# Patient Record
Sex: FEMALE
[De-identification: names, ages, dates, MRNs, and addresses within clinical notes are randomized per-mention and may not be internally consistent; named-entity substitution may affect disease eponyms.]

---

## 2023-12-13 ENCOUNTER — NON-APPOINTMENT (OUTPATIENT)
Age: 38
End: 2023-12-13

## 2023-12-13 ENCOUNTER — APPOINTMENT (OUTPATIENT)
Dept: OPHTHALMOLOGY | Facility: CLINIC | Age: 38
End: 2023-12-13
Payer: COMMERCIAL

## 2023-12-13 PROCEDURE — 92025 CPTRIZED CORNEAL TOPOGRAPHY: CPT

## 2023-12-13 PROCEDURE — 68761 CLOSE TEAR DUCT OPENING: CPT | Mod: E1,E2,E3,E4

## 2023-12-13 PROCEDURE — 92002 INTRM OPH EXAM NEW PATIENT: CPT | Mod: 25

## 2024-01-08 ENCOUNTER — APPOINTMENT (OUTPATIENT)
Dept: ORTHOPEDIC SURGERY | Facility: CLINIC | Age: 39
End: 2024-01-08

## 2024-01-17 ENCOUNTER — NON-APPOINTMENT (OUTPATIENT)
Age: 39
End: 2024-01-17

## 2024-01-17 ENCOUNTER — APPOINTMENT (OUTPATIENT)
Dept: OPHTHALMOLOGY | Facility: CLINIC | Age: 39
End: 2024-01-17
Payer: COMMERCIAL

## 2024-01-17 PROCEDURE — 92250 FUNDUS PHOTOGRAPHY W/I&R: CPT

## 2024-01-17 PROCEDURE — 92014 COMPRE OPH EXAM EST PT 1/>: CPT

## 2024-02-06 PROBLEM — Z00.00 ENCOUNTER FOR PREVENTIVE HEALTH EXAMINATION: Status: ACTIVE | Noted: 2024-02-06

## 2024-02-08 ENCOUNTER — APPOINTMENT (OUTPATIENT)
Dept: ORTHOPEDIC SURGERY | Facility: CLINIC | Age: 39
End: 2024-02-08
Payer: COMMERCIAL

## 2024-02-08 DIAGNOSIS — S63.501A UNSPECIFIED SPRAIN OF RIGHT WRIST, INITIAL ENCOUNTER: ICD-10-CM

## 2024-02-08 DIAGNOSIS — Z78.9 OTHER SPECIFIED HEALTH STATUS: ICD-10-CM

## 2024-02-08 DIAGNOSIS — M67.911 UNSPECIFIED DISORDER OF SYNOVIUM AND TENDON, RIGHT SHOULDER: ICD-10-CM

## 2024-02-08 DIAGNOSIS — M77.01 MEDIAL EPICONDYLITIS, RIGHT ELBOW: ICD-10-CM

## 2024-02-08 DIAGNOSIS — M25.531 PAIN IN RIGHT WRIST: ICD-10-CM

## 2024-02-08 PROCEDURE — 99203 OFFICE O/P NEW LOW 30 MIN: CPT

## 2024-02-08 PROCEDURE — 73030 X-RAY EXAM OF SHOULDER: CPT | Mod: RT

## 2024-02-08 PROCEDURE — 73100 X-RAY EXAM OF WRIST: CPT | Mod: RT

## 2024-02-09 PROBLEM — Z78.9 NON-SMOKER: Status: ACTIVE | Noted: 2024-02-09

## 2024-02-09 PROBLEM — Z78.9 MODERATE EXERCISE: Status: ACTIVE | Noted: 2024-02-09

## 2024-02-09 NOTE — REASON FOR VISIT
[Initial Visit] : an initial visit for [Shoulder Pain] : shoulder pain [FreeTextEntry2] : RIGHT wrist

## 2024-02-09 NOTE — PHYSICAL EXAM
[UE] : Sensory: Intact in bilateral upper extremities [Normal RUE] : Right Upper Extremity: No scars, rashes, lesions, ulcers, skin intact [Normal LUE] : Left Upper Extremity: No scars, rashes, lesions, ulcers, skin intact [Normal Touch] : sensation intact for touch [Normal] : Gait: normal [Rad] : radial 2+ and symmetric bilaterally [de-identified] : RIGHT shoulder No edema, ecchymoses, erythema, deformity. Tender over the long head of the biceps, anterior shoulder. Active range of motion of the shoulders with 180 degrees forward elevation and internal rotation to T9. Pain with Neer and mildly with Wade. 5/5 supraspinatus, internal rotation, external rotation, biceps.  Right elbow Range of motion 0 to 145 degrees flexion and 90 degrees pronation and supination. Tender mildly over the medial epicondyle and common flexor pronator origin. No edema, ecchymoses, erythema, deformity.  Right wrist No edema, ecchymoses, erythema. Tender over the ulnar side of the wrist. Pain with maximum supination and ulnar deviation.  Pain worse with flexion and mildly on the wrist as well. Intact range of motion with about 80 degrees dorsiflexion and 90 degrees of palmar flexion. Intact range of motion of the fingers. Motor and sensation are intact.  [de-identified] :  X-rays ordered, performed and reviewed today of RIGHT wrist 3 views for wrist pain showed ulnar neutral to slightly ulnar positive variance.  No fractures.  Otherwise unremarkable.   X-rays ordered, performed and reviewed today of RIGHT shoulder for chronic shoulder pain AP, Y lateral and axillary views were unremarkable.  No calcifications or bone lesions.

## 2024-02-09 NOTE — ASSESSMENT
[FreeTextEntry1] : 37 y/o female right-hand-dominant with persistent right ulnar-sided wrist pain going on for more than 3 months and has not improved with 2 months of rest and NSAIDs and wearing a wrist splint. She could have a tear of the TFCC given her exam findings.  I referred her for an MRI to see if there is a tear.  We may try steroid injection or I may refer her to the hand surgeon for further evaluation.  In the meantime she can continue with the treatment and use heat and ice.  I would not recommend getting back to tennis yet because of the wrist but also she is having elbow and shoulder pain.  I recommended doing some hand and physical therapy for the knees.  The elbow has mild medial epicondylitis and her shoulder she likely has some biceps and rotator cuff tendinopathy.  I gave her some home exercises for the shoulder and she was given other exercises by the other doctor.  She can take Voltaren or other NSAID as well for another week or 2.  I will call her with the MRI results of the wrist and discuss findings.  I would otherwise see her back in about 4 to 6 weeks.

## 2024-02-09 NOTE — HISTORY OF PRESENT ILLNESS
[de-identified] :  Ms. Craig is a 38 year old RHD woman who comes in for evaluation for RIGHT wrist pain that started about 2.5 months ago she feels from over use, playing tennis..  She started playing tennis in September 2023 and increased her hours playing. She ignored the pain for a while but it got progressively worse and more constant. She rested for 2 months.  She is only played tennis of couple times in the last few months but has ongoing pain that really does not seem to be improving.  She also tried a wrist splint. Her pain ranges from 3-8/10 that is better with rest. She has pain lifting weights, pushups, opening jars.  She saw a rehab doctor for right shoulder pain.  She was given exercises to do and diclofenac which she took for about 10 days to 2 weeks.  She has ongoing shoulder pain and is also now having right medial elbow pain.  The doctor had done an ultrasound of the shoulder and apparently there would have been some fraying but no tears in the rotator cuff was seen She has not done any physical therapy.

## 2024-02-22 ENCOUNTER — TRANSCRIPTION ENCOUNTER (OUTPATIENT)
Age: 39
End: 2024-02-22

## 2024-02-23 ENCOUNTER — TRANSCRIPTION ENCOUNTER (OUTPATIENT)
Age: 39
End: 2024-02-23

## 2024-02-26 ENCOUNTER — NON-APPOINTMENT (OUTPATIENT)
Age: 39
End: 2024-02-26

## 2024-02-26 ENCOUNTER — APPOINTMENT (OUTPATIENT)
Dept: OPHTHALMOLOGY | Facility: CLINIC | Age: 39
End: 2024-02-26
Payer: COMMERCIAL

## 2024-02-26 PROCEDURE — 92133 CPTRZD OPH DX IMG PST SGM ON: CPT

## 2024-02-26 PROCEDURE — 92012 INTRM OPH EXAM EST PATIENT: CPT | Mod: 25

## 2024-02-26 PROCEDURE — 68761 CLOSE TEAR DUCT OPENING: CPT

## 2024-02-26 PROCEDURE — 67820 REVISE EYELASHES: CPT | Mod: RT

## 2024-07-31 ENCOUNTER — NON-APPOINTMENT (OUTPATIENT)
Age: 39
End: 2024-07-31

## 2024-07-31 ENCOUNTER — APPOINTMENT (OUTPATIENT)
Dept: OPHTHALMOLOGY | Facility: CLINIC | Age: 39
End: 2024-07-31
Payer: COMMERCIAL

## 2024-07-31 PROCEDURE — 92012 INTRM OPH EXAM EST PATIENT: CPT | Mod: 25

## 2024-07-31 PROCEDURE — 68761 CLOSE TEAR DUCT OPENING: CPT | Mod: E1,E2,E3,E4

## 2024-09-06 ENCOUNTER — EMERGENCY (EMERGENCY)
Facility: HOSPITAL | Age: 39
LOS: 1 days | Discharge: HOME | End: 2024-09-06
Attending: STUDENT IN AN ORGANIZED HEALTH CARE EDUCATION/TRAINING PROGRAM | Admitting: STUDENT IN AN ORGANIZED HEALTH CARE EDUCATION/TRAINING PROGRAM
Payer: COMMERCIAL

## 2024-09-06 VITALS — WEIGHT: 117.95 LBS | TEMPERATURE: 99 F | OXYGEN SATURATION: 100 % | RESPIRATION RATE: 18 BRPM | HEART RATE: 56 BPM

## 2024-09-06 DIAGNOSIS — R19.7 DIARRHEA, UNSPECIFIED: ICD-10-CM

## 2024-09-06 DIAGNOSIS — R11.2 NAUSEA WITH VOMITING, UNSPECIFIED: ICD-10-CM

## 2024-09-06 DIAGNOSIS — N83.202 UNSPECIFIED OVARIAN CYST, LEFT SIDE: ICD-10-CM

## 2024-09-06 DIAGNOSIS — R10.9 UNSPECIFIED ABDOMINAL PAIN: ICD-10-CM

## 2024-09-06 LAB
ALBUMIN SERPL ELPH-MCNC: 4.4 G/DL — SIGNIFICANT CHANGE UP (ref 3.3–5)
ALP SERPL-CCNC: 43 U/L — SIGNIFICANT CHANGE UP (ref 40–120)
ALT FLD-CCNC: 10 U/L — SIGNIFICANT CHANGE UP (ref 10–45)
ANION GAP SERPL CALC-SCNC: 11 MMOL/L — SIGNIFICANT CHANGE UP (ref 5–17)
AST SERPL-CCNC: 15 U/L — SIGNIFICANT CHANGE UP (ref 10–40)
BILIRUB DIRECT SERPL-MCNC: 0.2 MG/DL — SIGNIFICANT CHANGE UP (ref 0–0.3)
BILIRUB INDIRECT FLD-MCNC: 1.2 MG/DL — HIGH (ref 0.2–1)
BILIRUB SERPL-MCNC: 1.4 MG/DL — HIGH (ref 0.2–1.2)
BUN SERPL-MCNC: 6 MG/DL — LOW (ref 7–23)
CALCIUM SERPL-MCNC: 9.1 MG/DL — SIGNIFICANT CHANGE UP (ref 8.4–10.5)
CHLORIDE SERPL-SCNC: 100 MMOL/L — SIGNIFICANT CHANGE UP (ref 96–108)
CO2 SERPL-SCNC: 22 MMOL/L — SIGNIFICANT CHANGE UP (ref 22–31)
CREAT SERPL-MCNC: 0.58 MG/DL — SIGNIFICANT CHANGE UP (ref 0.5–1.3)
EGFR: 119 ML/MIN/1.73M2 — SIGNIFICANT CHANGE UP
GLUCOSE SERPL-MCNC: 123 MG/DL — HIGH (ref 70–99)
HCG SERPL-ACNC: <1 MIU/ML — SIGNIFICANT CHANGE UP
HCT VFR BLD CALC: 38 % — SIGNIFICANT CHANGE UP (ref 34.5–45)
HGB BLD-MCNC: 13 G/DL — SIGNIFICANT CHANGE UP (ref 11.5–15.5)
LIDOCAIN IGE QN: 23 U/L — SIGNIFICANT CHANGE UP (ref 7–60)
MCHC RBC-ENTMCNC: 30.7 PG — SIGNIFICANT CHANGE UP (ref 27–34)
MCHC RBC-ENTMCNC: 34.2 GM/DL — SIGNIFICANT CHANGE UP (ref 32–36)
MCV RBC AUTO: 89.6 FL — SIGNIFICANT CHANGE UP (ref 80–100)
NRBC # BLD: 0 /100 WBCS — SIGNIFICANT CHANGE UP (ref 0–0)
PLATELET # BLD AUTO: 213 K/UL — SIGNIFICANT CHANGE UP (ref 150–400)
POTASSIUM SERPL-MCNC: 3.8 MMOL/L — SIGNIFICANT CHANGE UP (ref 3.5–5.3)
POTASSIUM SERPL-SCNC: 3.8 MMOL/L — SIGNIFICANT CHANGE UP (ref 3.5–5.3)
PROT SERPL-MCNC: 7.1 G/DL — SIGNIFICANT CHANGE UP (ref 6–8.3)
RBC # BLD: 4.24 M/UL — SIGNIFICANT CHANGE UP (ref 3.8–5.2)
RBC # FLD: 11.8 % — SIGNIFICANT CHANGE UP (ref 10.3–14.5)
SODIUM SERPL-SCNC: 133 MMOL/L — LOW (ref 135–145)
WBC # BLD: 6.9 K/UL — SIGNIFICANT CHANGE UP (ref 3.8–10.5)
WBC # FLD AUTO: 6.9 K/UL — SIGNIFICANT CHANGE UP (ref 3.8–10.5)

## 2024-09-06 PROCEDURE — 76856 US EXAM PELVIC COMPLETE: CPT | Mod: 26

## 2024-09-06 PROCEDURE — 99285 EMERGENCY DEPT VISIT HI MDM: CPT

## 2024-09-06 PROCEDURE — 76830 TRANSVAGINAL US NON-OB: CPT | Mod: 26

## 2024-09-06 RX ORDER — SODIUM CHLORIDE 9 MG/ML
1000 INJECTION INTRAMUSCULAR; INTRAVENOUS; SUBCUTANEOUS ONCE
Refills: 0 | Status: COMPLETED | OUTPATIENT
Start: 2024-09-06 | End: 2024-09-06

## 2024-09-06 RX ORDER — ONDANSETRON 2 MG/ML
4 INJECTION, SOLUTION INTRAMUSCULAR; INTRAVENOUS ONCE
Refills: 0 | Status: COMPLETED | OUTPATIENT
Start: 2024-09-06 | End: 2024-09-06

## 2024-09-06 RX ADMIN — SODIUM CHLORIDE 1000 MILLILITER(S): 9 INJECTION INTRAMUSCULAR; INTRAVENOUS; SUBCUTANEOUS at 21:37

## 2024-09-06 RX ADMIN — Medication 4 MILLIGRAM(S): at 21:37

## 2024-09-06 RX ADMIN — ONDANSETRON 4 MILLIGRAM(S): 2 INJECTION, SOLUTION INTRAMUSCULAR; INTRAVENOUS at 21:37

## 2024-09-06 NOTE — ED ADULT NURSE NOTE - OBJECTIVE STATEMENT
Pt  is a 39 year old female came in complaining of severe abdominal pain in hypogastric area. Pt also complaining of nausea and vomited more than 5 episodes.

## 2024-09-06 NOTE — CONSULT NOTE ADULT - SUBJECTIVE AND OBJECTIVE BOX
38 yo P2 presents with left lower pelvic pain that began at 19:00 on . Patient states she was cooking dinner and then she started having cramping which turned into severe pain 10/10 in severity. Her pain was a/w with nausea and vomiting and she had a few episodes of diarrhea. Patient presented to [facility] at 04:00 where she had a TVUS and CT scan, received morphine and was discharged with gastroenteritis. She states she felt better but then presented here because her severe pain came back. Patient does not get her menses because she has an IUD in place. Patient states that this pain was as bad as childbirth.         OB/GYN Hx:  2017, 2018  PMHx: denies  SHx: bunion sx  Meds: wellbutrin 5mg  Allergies: NKDA    PHYSICAL EXAM:   Vital Signs Last 24 Hrs  T(C): 37.1 (06 Sep 2024 21:07), Max: 37.1 (06 Sep 2024 21:07)  T(F): 98.7 (06 Sep 2024 21:07), Max: 98.7 (06 Sep 2024 21:07)  HR: 56 (06 Sep 2024 21:07) (56 - 56)  BP: --  BP(mean): --  RR: 18 (06 Sep 2024 21:07) (18 - 18)  SpO2: 100% (06 Sep 2024 21:07) (100% - 100%)    Parameters below as of 06 Sep 2024 21:07  Patient On (Oxygen Delivery Method): room air        **************************  Constitutional: Alert & Oriented x3, No acute distress  Respiratory: Clear to ausculation bilaterally; no wheezing, rhonchi, or crackles  Cardiovascular: regular rate and rhythm, no murmurs, or gallops  Gastrointestinal: soft, non tender, positive bowel sounds, no rebound or guarding   Pelvic exam:   Extremities: no calf tenderness or swelling      LABS:                        13.0   6.90  )-----------( 213      ( 06 Sep 2024 21:19 )             38.0     09-06    133<L>  |  100  |  6<L>  ----------------------------<  123<H>  3.8   |  22  |  0.58    Ca    9.1      06 Sep 2024 21:19    TPro  7.1  /  Alb  4.4  /  TBili  1.4<H>  /  DBili  0.2  /  AST  15  /  ALT  10  /  AlkPhos  43        Urinalysis Basic - ( 06 Sep 2024 21:19 )    Color: x / Appearance: x / SG: x / pH: x  Gluc: 123 mg/dL / Ketone: x  / Bili: x / Urobili: x   Blood: x / Protein: x / Nitrite: x   Leuk Esterase: x / RBC: x / WBC x   Sq Epi: x / Non Sq Epi: x / Bacteria: x      HCG Quantitative, Serum: <1 mIU/mL ( @ 21:19)      RADIOLOGY & ADDITIONAL STUDIES: 40 yo P2 presents with left lower pelvic pain that began at 19:00 on . Patient states she was cooking dinner and then she started having cramping which turned into severe pain 10/10 in severity. Her pain was a/w with nausea and vomiting and she had a few episodes of diarrhea. Patient presented to [facility] at 04:00 where she had a TVUS and CT scan, received morphine and was discharged with gastroenteritis. She states she felt better but then presented here because her severe pain came back. Patient does not get her menses because she has an IUD in place. At the time of initial interview patient rated her pain as 3/10 in severity, cramping in nature.      OB/GYN Hx:  , 2018  PMHx: denies  SHx: bunion sx  Meds: wellbutrin 5mg  Allergies: NKDA    PHYSICAL EXAM:   Vital Signs Last 24 Hrs  T(C): 37.1 (06 Sep 2024 21:07), Max: 37.1 (06 Sep 2024 21:07)  T(F): 98.7 (06 Sep 2024 21:07), Max: 98.7 (06 Sep 2024 21:07)  HR: 56 (06 Sep 2024 21:07) (56 - 56)  BP: --  BP(mean): --  RR: 18 (06 Sep 2024 21:07) (18 - 18)  SpO2: 100% (06 Sep 2024 21:07) (100% - 100%)    Parameters below as of 06 Sep 2024 21:07  Patient On (Oxygen Delivery Method): room air        **************************  Constitutional: Alert & Oriented x3, No acute distress  Respiratory: Clear to ausculation bilaterally; no wheezing, rhonchi, or crackles  Cardiovascular: regular rate and rhythm, no murmurs, or gallops  Gastrointestinal: soft, mild-to moderate left lower quadrant tenderness, no rebound or guarding   Pelvic exam: no CMT, no adnexal tenderness bilaterally  Extremities: no calf tenderness or swelling      LABS:                        13.0   6.90  )-----------( 213      ( 06 Sep 2024 21:19 )             38.0     09-06    133<L>  |  100  |  6<L>  ----------------------------<  123<H>  3.8   |  22  |  0.58    Ca    9.1      06 Sep 2024 21:19    TPro  7.1  /  Alb  4.4  /  TBili  1.4<H>  /  DBili  0.2  /  AST  15  /  ALT  10  /  AlkPhos  43        Urinalysis Basic - ( 06 Sep 2024 21:19 )    Color: x / Appearance: x / SG: x / pH: x  Gluc: 123 mg/dL / Ketone: x  / Bili: x / Urobili: x   Blood: x / Protein: x / Nitrite: x   Leuk Esterase: x / RBC: x / WBC x   Sq Epi: x / Non Sq Epi: x / Bacteria: x      HCG Quantitative, Serum: <1 mIU/mL ( @ 21:19)      RADIOLOGY & ADDITIONAL STUDIES:  TECHNIQUE:  Endovaginal and transabdominal pelvic sonogram. Color and Spectral   Doppler was performed.    FINDINGS:  Uterus: 6.6 cm x 3.8 cm x 4.9 cm. Satisfactory position of intrauterine   device with stem aligned within the endometrial cavity and cross bars   extending laterally at fundus.  Endometrium: 2 mm. Within normal limits. Cervical nabothian cysts.    Right ovary: 2.3 cm x 2.2 cm x 1.7 cm calculated volume of 4.3 cc. Within   normal limits. Normal arterial and venous waveforms.  Left ovary: 4.6 cm x 2.5 cm x 2.6 cm calculated volume of 15.5 cc.   ovarian cyst measuring 3.4 x 2.5 x 2.3 cm. Normal arterial and venous   waveforms.    Fluid: Trace.    IMPRESSION:  1.  No sonographic evidence of active torsion.  2.  3.4 cm left ovarian cyst.

## 2024-09-06 NOTE — ED PROVIDER NOTE - OBJECTIVE STATEMENT
39 F no sig PMH seen at [facility] this morning for abd pain, nvd. Had 3.7 cm ovarian cyst without torsion, otherwise neg US and CTAP. Had several episodes of watery stools last night, vomiting today. unable to keep anything down. Pain primarily in left lower quadrant. [place] LMP given IUD. No abnl vag discharge.

## 2024-09-06 NOTE — ED PROVIDER NOTE - CLINICAL SUMMARY MEDICAL DECISION MAKING FREE TEXT BOX
39 F no sig PMH seen at [facility] this morning for abd pain, nvd. Had 3.7 cm left ovarian cyst without torsion, otherwise neg US and CTAP. Had several episodes of watery stools last night, vomiting today. unable to keep anything down. Pain primarily in left lower quadrant. [place] LMP given IUD. No abnl vag discharge.    c/f intermittent torsion vs torsion at this time iso neg CTAP this morning other than 3.7 cm ovarian cyst and location of cyst. Plan for labs, rpt sono, gyn consult for intermittent torsion.

## 2024-09-06 NOTE — ED PROVIDER NOTE - PROGRESS NOTE DETAILS
Jesu: seen by gyn, no concern for torsion. more likely gastroenteritis. pt tolerating PO. labs wnl. return precautions discussed

## 2024-09-06 NOTE — ED ADULT NURSE NOTE - NSFALLUNIVINTERV_ED_ALL_ED
Bed/Stretcher in lowest position, wheels locked, appropriate side rails in place/Call bell, personal items and telephone in reach/Instruct patient to call for assistance before getting out of bed/chair/stretcher/Non-slip footwear applied when patient is off stretcher/[place] to call system/Physically safe environment - no spills, clutter or unnecessary equipment/Purposeful proactive rounding/Room/bathroom lighting operational, light cord in reach

## 2024-09-06 NOTE — CONSULT NOTE ADULT - ASSESSMENT
38 yo P2 presents with intermittent left lower quadrant pain. On first interview patients pain was 3/10 in severity, upon second time evaluating patient with chief resident Dr. Naylor and attending Dr. Dorantes patient states her pain was starting to creep up and is now 6/10 cramping and associated with nausea. Patient has a benign abdomen and TVUS significant for 3cm left ovarian cyst.  -patient is currently hemodynamically and clinically stable  -abdominal exam performed at bedside with  who agreed it was benign and less likely to be torsion  -differential includes ruptured ovarian cyst, gastroenteritis given patient has diarrhea or that patient had torsion and detorsion and now has residual cramping  -currently low suspicion for ovarian torsion  -patient to take antiemetics for nausea and analgesics for pain  -patient to follow up with obgyn next week  -return precautions including severe pain and/or bleeding given to patient  -all questions answered and patient expressed understanding  -discussed with chief resident Dr. Naylor and  Dr. Dorantes who were both present for evaluation    NS PGY2

## 2024-09-06 NOTE — ED PROVIDER NOTE - PATIENT PORTAL LINK FT
You can access the FollowMyHealth Patient Portal offered by [facility] by registering at the following website: [email or web address]. By joining [employer]’s FollowMyHealth portal, you will also be able to view your health information using other applications (apps) compatible with our system.

## 2024-09-07 ENCOUNTER — INPATIENT (INPATIENT)
Facility: HOSPITAL | Age: 39
LOS: 0 days | Discharge: HOME | DRG: 392 | End: 2024-09-08
Attending: HOSPITALIST | Admitting: HOSPITALIST
Payer: COMMERCIAL

## 2024-09-07 VITALS
WEIGHT: 117.95 LBS | TEMPERATURE: 98 F | OXYGEN SATURATION: 100 % | RESPIRATION RATE: 18 BRPM | HEART RATE: 67 BPM | DIASTOLIC BLOOD PRESSURE: 78 MMHG | SYSTOLIC BLOOD PRESSURE: 114 MMHG

## 2024-09-07 VITALS
OXYGEN SATURATION: 97 % | SYSTOLIC BLOOD PRESSURE: 105 MMHG | RESPIRATION RATE: 18 BRPM | DIASTOLIC BLOOD PRESSURE: 64 MMHG | TEMPERATURE: 99 F | HEART RATE: 60 BPM

## 2024-09-07 DIAGNOSIS — R10.9 UNSPECIFIED ABDOMINAL PAIN: ICD-10-CM

## 2024-09-07 DIAGNOSIS — R11.2 NAUSEA WITH VOMITING, UNSPECIFIED: ICD-10-CM

## 2024-09-07 DIAGNOSIS — F41.9 ANXIETY DISORDER, UNSPECIFIED: ICD-10-CM

## 2024-09-07 DIAGNOSIS — Z29.9 ENCOUNTER FOR PROPHYLACTIC MEASURES, UNSPECIFIED: ICD-10-CM

## 2024-09-07 DIAGNOSIS — N83.209 UNSPECIFIED OVARIAN CYST, UNSPECIFIED SIDE: ICD-10-CM

## 2024-09-07 LAB
ALBUMIN SERPL ELPH-MCNC: 4.5 G/DL — SIGNIFICANT CHANGE UP (ref 3.3–5)
ALP SERPL-CCNC: 45 U/L — SIGNIFICANT CHANGE UP (ref 40–120)
ALT FLD-CCNC: 11 U/L — SIGNIFICANT CHANGE UP (ref 10–45)
ANION GAP SERPL CALC-SCNC: 10 MMOL/L — SIGNIFICANT CHANGE UP (ref 5–17)
APPEARANCE UR: CLEAR — SIGNIFICANT CHANGE UP
AST SERPL-CCNC: 15 U/L — SIGNIFICANT CHANGE UP (ref 10–40)
BASOPHILS # BLD AUTO: 0 K/UL — SIGNIFICANT CHANGE UP (ref 0–0.2)
BASOPHILS NFR BLD AUTO: 0 % — SIGNIFICANT CHANGE UP (ref 0–2)
BILIRUB SERPL-MCNC: 1.2 MG/DL — SIGNIFICANT CHANGE UP (ref 0.2–1.2)
BILIRUB UR-MCNC: NEGATIVE — SIGNIFICANT CHANGE UP
BLD GP AB SCN SERPL QL: NEGATIVE — SIGNIFICANT CHANGE UP
BUN SERPL-MCNC: 5 MG/DL — LOW (ref 7–23)
CALCIUM SERPL-MCNC: 9.5 MG/DL — SIGNIFICANT CHANGE UP (ref 8.4–10.5)
CHLORIDE SERPL-SCNC: 104 MMOL/L — SIGNIFICANT CHANGE UP (ref 96–108)
CO2 SERPL-SCNC: 21 MMOL/L — LOW (ref 22–31)
COLOR SPEC: YELLOW — SIGNIFICANT CHANGE UP
CREAT SERPL-MCNC: 0.63 MG/DL — SIGNIFICANT CHANGE UP (ref 0.5–1.3)
DIFF PNL FLD: NEGATIVE — SIGNIFICANT CHANGE UP
EGFR: 117 ML/MIN/1.73M2 — SIGNIFICANT CHANGE UP
EOSINOPHIL # BLD AUTO: 0 K/UL — SIGNIFICANT CHANGE UP (ref 0–0.5)
EOSINOPHIL NFR BLD AUTO: 0 % — SIGNIFICANT CHANGE UP (ref 0–6)
GI PCR PANEL: SIGNIFICANT CHANGE UP
GLUCOSE SERPL-MCNC: 121 MG/DL — HIGH (ref 70–99)
GLUCOSE UR QL: NEGATIVE MG/DL — SIGNIFICANT CHANGE UP
HCG UR QL: NEGATIVE — SIGNIFICANT CHANGE UP
HCT VFR BLD CALC: 39.7 % — SIGNIFICANT CHANGE UP (ref 34.5–45)
HGB BLD-MCNC: 13.4 G/DL — SIGNIFICANT CHANGE UP (ref 11.5–15.5)
IMM GRANULOCYTES NFR BLD AUTO: 0.3 % — SIGNIFICANT CHANGE UP (ref 0–0.9)
KETONES UR-MCNC: ABNORMAL MG/DL
LEUKOCYTE ESTERASE UR-ACNC: NEGATIVE — SIGNIFICANT CHANGE UP
LIDOCAIN IGE QN: 22 U/L — SIGNIFICANT CHANGE UP (ref 7–60)
LYMPHOCYTES # BLD AUTO: 0.8 K/UL — LOW (ref 1–3.3)
LYMPHOCYTES # BLD AUTO: 11.1 % — LOW (ref 13–44)
MCHC RBC-ENTMCNC: 30.9 PG — SIGNIFICANT CHANGE UP (ref 27–34)
MCHC RBC-ENTMCNC: 33.8 GM/DL — SIGNIFICANT CHANGE UP (ref 32–36)
MCV RBC AUTO: 91.5 FL — SIGNIFICANT CHANGE UP (ref 80–100)
MONOCYTES # BLD AUTO: 0.19 K/UL — SIGNIFICANT CHANGE UP (ref 0–0.9)
MONOCYTES NFR BLD AUTO: 2.6 % — SIGNIFICANT CHANGE UP (ref 2–14)
NEUTROPHILS # BLD AUTO: 6.2 K/UL — SIGNIFICANT CHANGE UP (ref 1.8–7.4)
NEUTROPHILS NFR BLD AUTO: 86 % — HIGH (ref 43–77)
NITRITE UR-MCNC: NEGATIVE — SIGNIFICANT CHANGE UP
NRBC # BLD: 0 /100 WBCS — SIGNIFICANT CHANGE UP (ref 0–0)
PH UR: 8 — SIGNIFICANT CHANGE UP (ref 5–8)
PLATELET # BLD AUTO: 228 K/UL — SIGNIFICANT CHANGE UP (ref 150–400)
POTASSIUM SERPL-MCNC: 3.8 MMOL/L — SIGNIFICANT CHANGE UP (ref 3.5–5.3)
POTASSIUM SERPL-SCNC: 3.8 MMOL/L — SIGNIFICANT CHANGE UP (ref 3.5–5.3)
PROT SERPL-MCNC: 7.3 G/DL — SIGNIFICANT CHANGE UP (ref 6–8.3)
PROT UR-MCNC: NEGATIVE MG/DL — SIGNIFICANT CHANGE UP
RBC # BLD: 4.34 M/UL — SIGNIFICANT CHANGE UP (ref 3.8–5.2)
RBC # FLD: 11.8 % — SIGNIFICANT CHANGE UP (ref 10.3–14.5)
RH IG SCN BLD-IMP: POSITIVE — SIGNIFICANT CHANGE UP
SODIUM SERPL-SCNC: 135 MMOL/L — SIGNIFICANT CHANGE UP (ref 135–145)
SP GR SPEC: 1 — SIGNIFICANT CHANGE UP (ref 1–1.03)
UROBILINOGEN FLD QL: 0.2 MG/DL — SIGNIFICANT CHANGE UP (ref 0.2–1)
WBC # BLD: 7.21 K/UL — SIGNIFICANT CHANGE UP (ref 3.8–10.5)
WBC # FLD AUTO: 7.21 K/UL — SIGNIFICANT CHANGE UP (ref 3.8–10.5)

## 2024-09-07 PROCEDURE — 96375 TX/PRO/DX INJ NEW DRUG ADDON: CPT

## 2024-09-07 PROCEDURE — 36415 COLL VENOUS BLD VENIPUNCTURE: CPT

## 2024-09-07 PROCEDURE — 86850 RBC ANTIBODY SCREEN: CPT

## 2024-09-07 PROCEDURE — 96376 TX/PRO/DX INJ SAME DRUG ADON: CPT

## 2024-09-07 PROCEDURE — 99223 1ST HOSP IP/OBS HIGH 75: CPT | Mod: GC

## 2024-09-07 PROCEDURE — 85610 PROTHROMBIN TIME: CPT

## 2024-09-07 PROCEDURE — 76856 US EXAM PELVIC COMPLETE: CPT

## 2024-09-07 PROCEDURE — 80076 HEPATIC FUNCTION PANEL: CPT

## 2024-09-07 PROCEDURE — 85027 COMPLETE CBC AUTOMATED: CPT

## 2024-09-07 PROCEDURE — 80048 BASIC METABOLIC PNL TOTAL CA: CPT

## 2024-09-07 PROCEDURE — 99284 EMERGENCY DEPT VISIT MOD MDM: CPT | Mod: 25

## 2024-09-07 PROCEDURE — 85730 THROMBOPLASTIN TIME PARTIAL: CPT

## 2024-09-07 PROCEDURE — 84702 CHORIONIC GONADOTROPIN TEST: CPT

## 2024-09-07 PROCEDURE — 99285 EMERGENCY DEPT VISIT HI MDM: CPT

## 2024-09-07 PROCEDURE — 74177 CT ABD & PELVIS W/CONTRAST: CPT | Mod: 26,MC

## 2024-09-07 PROCEDURE — 86901 BLOOD TYPING SEROLOGIC RH(D): CPT

## 2024-09-07 PROCEDURE — 86900 BLOOD TYPING SEROLOGIC ABO: CPT

## 2024-09-07 PROCEDURE — 76830 TRANSVAGINAL US NON-OB: CPT

## 2024-09-07 PROCEDURE — 83690 ASSAY OF LIPASE: CPT

## 2024-09-07 PROCEDURE — 96374 THER/PROPH/DIAG INJ IV PUSH: CPT

## 2024-09-07 RX ORDER — IOHEXOL 350 MG/ML
30 INJECTION, SOLUTION INTRAVENOUS ONCE
Refills: 0 | Status: COMPLETED | OUTPATIENT
Start: 2024-09-07 | End: 2024-09-07

## 2024-09-07 RX ORDER — ONDANSETRON 2 MG/ML
4 INJECTION, SOLUTION INTRAMUSCULAR; INTRAVENOUS ONCE
Refills: 0 | Status: COMPLETED | OUTPATIENT
Start: 2024-09-07 | End: 2024-09-07

## 2024-09-07 RX ORDER — KETOROLAC TROMETHAMINE 30 MG/ML
15 INJECTION, SOLUTION INTRAMUSCULAR EVERY 6 HOURS
Refills: 0 | Status: DISCONTINUED | OUTPATIENT
Start: 2024-09-07 | End: 2024-09-08

## 2024-09-07 RX ORDER — SODIUM CHLORIDE 9 MG/ML
1000 INJECTION INTRAMUSCULAR; INTRAVENOUS; SUBCUTANEOUS ONCE
Refills: 0 | Status: COMPLETED | OUTPATIENT
Start: 2024-09-07 | End: 2024-09-07

## 2024-09-07 RX ORDER — ONDANSETRON 2 MG/ML
4 INJECTION, SOLUTION INTRAMUSCULAR; INTRAVENOUS EVERY 6 HOURS
Refills: 0 | Status: DISCONTINUED | OUTPATIENT
Start: 2024-09-07 | End: 2024-09-08

## 2024-09-07 RX ORDER — ESCITALOPRAM OXALATE 10 MG/1
1 TABLET ORAL
Refills: 0 | DISCHARGE

## 2024-09-07 RX ORDER — ACETAMINOPHEN 325 MG/1
650 TABLET ORAL EVERY 6 HOURS
Refills: 0 | Status: DISCONTINUED | OUTPATIENT
Start: 2024-09-07 | End: 2024-09-08

## 2024-09-07 RX ADMIN — Medication 4 MILLIGRAM(S): at 13:13

## 2024-09-07 RX ADMIN — Medication 4 MILLIGRAM(S): at 02:27

## 2024-09-07 RX ADMIN — KETOROLAC TROMETHAMINE 15 MILLIGRAM(S): 30 INJECTION, SOLUTION INTRAMUSCULAR at 18:20

## 2024-09-07 RX ADMIN — ONDANSETRON 4 MILLIGRAM(S): 2 INJECTION, SOLUTION INTRAMUSCULAR; INTRAVENOUS at 11:17

## 2024-09-07 RX ADMIN — KETOROLAC TROMETHAMINE 15 MILLIGRAM(S): 30 INJECTION, SOLUTION INTRAMUSCULAR at 17:45

## 2024-09-07 RX ADMIN — Medication 4 MILLIGRAM(S): at 11:17

## 2024-09-07 RX ADMIN — SODIUM CHLORIDE 1000 MILLILITER(S): 9 INJECTION INTRAMUSCULAR; INTRAVENOUS; SUBCUTANEOUS at 11:23

## 2024-09-07 RX ADMIN — ONDANSETRON 4 MILLIGRAM(S): 2 INJECTION, SOLUTION INTRAMUSCULAR; INTRAVENOUS at 12:59

## 2024-09-07 RX ADMIN — SODIUM CHLORIDE 1000 MILLILITER(S): 9 INJECTION INTRAMUSCULAR; INTRAVENOUS; SUBCUTANEOUS at 13:50

## 2024-09-07 RX ADMIN — ACETAMINOPHEN 650 MILLIGRAM(S): 325 TABLET ORAL at 22:27

## 2024-09-07 RX ADMIN — ONDANSETRON 4 MILLIGRAM(S): 2 INJECTION, SOLUTION INTRAMUSCULAR; INTRAVENOUS at 00:53

## 2024-09-07 RX ADMIN — Medication 80 MILLILITER(S): at 17:44

## 2024-09-07 RX ADMIN — Medication 4 MILLIGRAM(S): at 01:35

## 2024-09-07 RX ADMIN — IOHEXOL 30 MILLILITER(S): 350 INJECTION, SOLUTION INTRAVENOUS at 11:18

## 2024-09-07 RX ADMIN — ACETAMINOPHEN 650 MILLIGRAM(S): 325 TABLET ORAL at 21:27

## 2024-09-07 NOTE — H&P ADULT - NSHPSOCIALHISTORY_GEN_ALL_CORE
Tobacco: none  Alcohol: drinks about 1x per month  Recreational drugs: smokes marijuana once every few weeks, denies other drug use  Lives at home with  and 2 kids

## 2024-09-07 NOTE — ED ADULT NURSE NOTE - OBJECTIVE STATEMENT
Patient to the ED c/o worsening abdominal pain with n/v. Was seen in ED yesterday AM and PM and states symptoms are not improving. Took ODT zofran this AM without relief. Patient crying in pain, states pain is now radiating to epigastric region. AAOX4, NAD.

## 2024-09-07 NOTE — ED PROVIDER NOTE - CLINICAL SUMMARY MEDICAL DECISION MAKING FREE TEXT BOX
here w/ N/V and new abd pain into the R, 3rd ED visit in past few days. Will repeat CT scan w/ po and iv contrast to assess for appendicitis given new RLQ pain here w/ N/V and new abd pain into the R, 3rd ED visit in past few days. Will repeat CT scan w/ po and iv contrast to assess for appendicitis given new RLQ pain    update: ct negative, but given quite tender in RLQ, will request surgery team to evaluate patient here w/ N/V and new abd pain into the R, 3rd ED visit in past few days. Will repeat CT scan w/ po and iv contrast to assess for appendicitis given new RLQ pain    update: ct negative, but given quite tender in RLQ, will request surgery team to evaluate patient for concern for false negative on CT. on both of my abdominal exams, her LLQ was not particularly tender, doubt L cyst causing torsion at this time.

## 2024-09-07 NOTE — ED ADULT TRIAGE NOTE - CHIEF COMPLAINT QUOTE
" I was here last night."  +n/v, lower and uppper abdominal pain since thursday night. unable to take pain pills

## 2024-09-07 NOTE — ED PROVIDER NOTE - OBJECTIVE STATEMENT
38yo F here w/ abdominal pain x several days and N/V. Seen at [facility] and had normal CT scan at that time, seen yesterday and US showed L adnexal cyst, seen by GYN and not concerned for acute torsion at that time. Returns because pain returned and vomiting, unable to keep anything down. States pain now going to the R side as well which is new. L sided pain is still there.

## 2024-09-07 NOTE — PATIENT PROFILE ADULT - FUNCTIONAL ASSESSMENT - DAILY ACTIVITY 3.
4 = No assist / stand by assistance
Skin normal color for race, warm, dry and intact. No evidence of rash.

## 2024-09-07 NOTE — H&P ADULT - PROBLEM SELECTOR PLAN 1
Patient presenting with 3 day history of abdominal pain associated with nausea, vomiting, diarrhea. Currently afebrile, with no leukocytosis and CBC and CMP wnl. HCG negative. Gynecology was consulted during 9/6 ED visit with TVUS revealing left 3.4 cm ovarian cyst with no evidence of torsion. CTAP negative for any acute pathology. Surgery was consulted for further evaluation however no acute surgical intervention needed at this time. GI PCR negative. On re-examination, patient noted decrease in her abdominal pain and n/v/d, and was able to tolerate PO intake. Likely 2/2 gastroenteritis  - supportive care with IV hydration, zofran, tylenol, toradol  - advance diet as tolerated  - consider GI consult if pt doesn't improve

## 2024-09-07 NOTE — H&P ADULT - PROBLEM SELECTOR PLAN 5
Fluids: D5 LR  Electrolytes: Replete as needed  Diet: Full liquids  GI: None  VTE ppx: None  Code: Full

## 2024-09-07 NOTE — ED PROVIDER NOTE - PHYSICAL EXAMINATION
CONSTITUTIONAL: Well-appearing; well-nourished; in no apparent distress.   HEAD: Normocephalic; atraumatic.   EYES:  conjunctiva and sclera clear  ENT: normal nose; no rhinorrhea;  NECK: Supple; full ROM  RESPIRATORY: Breathing easily; no resp difficulty  GI: soft, diffusely tender but most tender in RLQ  EXT: No cyanosis or edema;  SKIN: Normal for age and race; warm; dry; good turgor; no apparent lesions or rash.   NEURO: A & O x 3; face symmetric; grossly unremarkable.   PSYCHOLOGICAL: The patient’s mood and manner are appropriate.

## 2024-09-07 NOTE — H&P ADULT - NSHPLABSRESULTS_GEN_ALL_CORE
.  LABS:                         13.4   7.21  )-----------( 228      ( 07 Sep 2024 10:50 )             39.7         135  |  104  |  5<L>  ----------------------------<  121<H>  3.8   |  21<L>  |  0.63    Ca    9.5      07 Sep 2024 10:50    TPro  7.3  /  Alb  4.5  /  TBili  1.2  /  DBili  x   /  AST  15  /  ALT  11  /  AlkPhos  45      PT/INR - ( 06 Sep 2024 21:21 )   PT: 11.1 sec;   INR: 0.97          PTT - ( 06 Sep 2024 21:21 )  PTT:33.5 sec  Urinalysis Basic - ( 07 Sep 2024 10:50 )    Color: Yellow / Appearance: Clear / S.005 / pH: x  Gluc: 121 mg/dL / Ketone: Trace mg/dL  / Bili: Negative / Urobili: 0.2 mg/dL   Blood: x / Protein: Negative mg/dL / Nitrite: Negative   Leuk Esterase: Negative / RBC: x / WBC x   Sq Epi: x / Non Sq Epi: x / Bacteria: x                RADIOLOGY, EKG & ADDITIONAL TESTS: Reviewed.     < from: US Transvaginal (24 @ 22:31) >    IMPRESSION:  1.  No sonographic evidence of active torsion.  2.  3.4 cm left ovarian cyst.    < end of copied text >    < from: CT Abdomen and Pelvis w/ Oral Cont and w/ IV Cont (24 @ 12:47) >    IMPRESSION:  3.3 cm left adnexal cystic again demonstrated.    < end of copied text >

## 2024-09-07 NOTE — CONSULT NOTE ADULT - ASSESSMENT
38 yo F with PMH anxiety, no relevant PSH presents 2 days of crampy lower abdominal pain. Patient has had multiple ED admissions for uncontrolled abdominal pain and nausea without clear source identified. CTAP done with no bowel obstruction. Appendix is normal, 3.3 cm left adnexal cystic again demonstrated. General Surgery consulted for evaluation of abdominal pain.    Recommendations:  - No acute surgical intervention warranted at this time  - GI PCR  - Monitor bowel function  - Resuscitation as needed  - General Surgery will sign off at this time, please reconsult with any questions or concerns  Plan discussed with chief resident and attending.

## 2024-09-07 NOTE — ED ADULT NURSE NOTE - PAIN: PRESENCE, MLM
72yo M with gastric cancer s/p total gastrectomy, splenectomy and distal panc in 2017 admitted with poor PO intake and aperistalsis of esophagus, now on TPN with plan for J tube on 11/23.  The patient is stable from a cardiovascular perspective.   complains of pain/discomfort

## 2024-09-07 NOTE — H&P ADULT - ATTENDING COMMENTS
39F with no PMHx presenting for several days of nausea, vomiting, diarrhea bilateral lower abdominal pain and decrease PO intake. Patient presented initially to [facility] ED where they did imaging of her abdomen (likely CT) and diagnosed her with gastroenteritis and recommended conservative management. She went home and given persistent symptoms presented to [facility] ED one day prior. We did an US and noted a ovarian cyst, Gyn consulted and low concern for torsion or gyn pathology and she was once again discharged. She's coming back today due to persistent symptoms and very poor PO intake i/s/o pain. In the ED given anti-emetics and morphine with relief.    She had a CT A&P here without pathology. Surgery consulted in case the scan was missing something, but they said nothing to do    VSS  Well appearing female, cramping LE abd    Labs personally reviewed  CBC WNL  CMP WNL    UA no pyuria  Preg neg    A/P:    - Abd pain w/ n/v/diarrhea of unclear etiology. GI PCR neg, but may still be a gastroenteritis. As such would treat symptomatically with IV hydration, diet as tolerated, and anti-emetics PRN. Can provide PRN medications, would use opioids sparingly  - If gastroenteritis symptoms should have peaked and will improve given has been going on for several days  - Could also try Bentyl for spasms  - If doesn't improve would consider GI consult, though hopefully will get better with time and conservative management    Rest of plan as documented above

## 2024-09-07 NOTE — H&P ADULT - HISTORY OF PRESENT ILLNESS
39 year old female with PMHx anxiety presenting with 3 day history of abdominal pain, nausea, vomiting, diarrhea. She states this began with abdominal pain a few hours after eating dinner. Denies any new foods however states she ate an  lanette bar earlier in the day. She had severe aching lower abdominal pain initially left sided associated with several episodes non emesis and diarrhea prompting her to present to [place] on  where she had normal CT scan and pelvic US revealing 3.7 cm ovarian cyst without torsion. She was d/c with zofran, tylenol, ibuprofen. She presented to [facility] ED later that night with worsening symptoms. GYN was consulted with TVUS revealing 3.4cm left ovarian cyst without torsion. Pt was discharged however returned today with worsening lower abdominal pain, n/v/d. Pt states the pain located more R lower abdomen. She denies any sick contacts, fevers, chills, melena, hematochezia, HA, CP, SOB, weakness. Only medication lexapro 5 mg daily and claritin and flonase for allergies.     ED Course:  Vitals: T98, HR 67, /78, RR 18, O2 100 RA  Labs: CBC unremarkable 39 year old female with PMHx anxiety presenting with 3 day history of abdominal pain, nausea, vomiting, diarrhea. She states this began with abdominal pain a few hours after eating dinner. Denies any new foods however states she ate an  lanette bar earlier in the day. She had severe aching lower abdominal pain initially left sided associated with several episodes nausea, vomiting, and diarrhea prompting her to present to [place] on  where she had normal CT scan. She was d/c with zofran, tylenol, ibuprofen. She presented to [facility] ED later that night with worsening symptoms. GYN was consulted with TVUS revealing 3.4cm left ovarian cyst without torsion. Pt was discharged however returned today with worsening lower abdominal pain, n/v/d. Pt states the pain located more R lower abdomen. She denies any sick contacts, fevers, chills, melena, hematochezia, HA, CP, SOB, weakness. Only medication lexapro 5 mg daily and claritin and flonase for allergies.     ED Course:  Vitals: T98, HR 67, /78, RR 18, O2 100 RA  Labs: CBC unremarkable, glucose 121, HCG negative, UA with trace ketones  TVUS: No sonographic evidence of active torsion. 3.4 cm left ovarian cyst.  CTAP w/ oral & IV contrast: 3.3 cm left adnexal cystic again demonstrated.  Interventions: Morphine 4mg x2 IVP, Zofran 4 mg x2 IVP, 2L NS

## 2024-09-07 NOTE — H&P ADULT - ASSESSMENT
39 year old female with PMHx anxiety presenting with 3 day history of abdominal pain, nausea, vomiting, diarrhea of unclear etiology, admitted for further management.

## 2024-09-07 NOTE — CONSULT NOTE ADULT - ATTENDING COMMENTS
Started having some diarrhea again. Green, no mucus or blood.  Lower abdominal pain, has improved while in the hospital.  No nausea or vomiting now.  Tolerating clears.  CT - no acute surgical pathology.  Abdomen is soft, mild discomfort on palpation of lower abdomen. No palpable masses, no peritoneal signs.   Most likely symptoms due to GI infection.  No indication for acute surgical intervention.  Thank you for the consultation.

## 2024-09-07 NOTE — PATIENT PROFILE ADULT - NSPRESCRUSEDDRG_GEN_A_NUR
Deshaun Lam  CARDIOLOGY  [facility] [place] [address]  Phone: [phone]  Fax: [phone]  Established Patient  Scheduled Appointment: 07/08/2020
No

## 2024-09-07 NOTE — H&P ADULT - NSHPPHYSICALEXAM_GEN_ALL_CORE
T(C): 37.2 (09-07-24 @ 21:41), Max: 37.2 (09-07-24 @ 21:41)  HR: 58 (09-07-24 @ 21:41) (50 - 67)  BP: 113/78 (09-07-24 @ 21:41) (105/64 - 131/82)  RR: 17 (09-07-24 @ 21:41) (17 - 18)  SpO2: 98% (09-07-24 @ 21:41) (97% - 100%)    CONSTITUTIONAL: Well groomed, no apparent distress  EYES: PERRLA and symmetric, EOMI, No conjunctival or scleral injection, non-icteric  ENMT: Dry mucous membranes  NECK: Supple, symmetric and without tracheal deviation   RESP: No respiratory distress, no use of accessory muscles; CTA b/l, no WRR  CV: RRR, +S1S2, no MRG; no JVD; no peripheral edema  GI: Soft, mild tenderness to palpation lower abdomen, no rebound, no guarding;   MSK: Normal ROM without pain, normal muscle strength/tone  SKIN: No rashes or ulcers noted; no subcutaneous nodules or induration palpable  NEURO: A&Ox3, sensation intact in upper and lower extremities b/l to light touch   PSYCH: Appropriate insight/judgment; mood and affect appropriate

## 2024-09-07 NOTE — CONSULT NOTE ADULT - SUBJECTIVE AND OBJECTIVE BOX
38 yo F with PMH anxiety, no relevant PSH presents 2 days of crampy lower abdominal pain. She states that she first noticed the pain Thursday evening, three hours after eating an  lanette bar. The pain increased in severity overnight, it was initially epigastric but overtime has become worse in bilateral lower quadrants where it has remained, and was associated with multiple episodes of watery, nonbloody diarrhea. She endorses chills however denies any recorded fever. Her last bowel movement was yesterday at 4pm, she endorses passing less frequent flatus. She initially presented to [facility] where she underwent TVUS and CT and was discharged without any acute pathology elucidated. Upon return home her pain continued and her nausea was not well controlled, prompting her to present to [facility]. She was seen by the GYN team and she underwent pelvic US and TVUS with no sonographic evidence of active torsion. 2. 3.4 cm left ovarian cyst. She was then discharged with PO pain and anti-nausea medication. She returned home with again re-presentation of her symptoms prompting her to return to our ED.     In the ED she is afebrile, normotensive, satting well on RA. On exam her abdomen is soft, mildly distended, mildly ttp in bilateral lower quadrants without rebound or guarding. Her labs without leukocytosis WBC 7.21 with a left shift, chemistry with glucose of 121 but otherwise unremarkable. UA with trace ketones. CTAP done with no bowel obstruction. Appendix is normal, 3.3 cm left adnexal cystic again demonstrated.    PMH: anxiety  PSH: bunion surgery   Home meds: escitalopram 5mg  SHx: EtOH 1/month, denies cigarette use, denies any other drug use  FHx: denies family history of ulcerative colitis, crohn's disease, colon cancer  Colonoscopy: denies  EGD: denies        Vital Signs Last 24 Hrs  T(C): 37.1 (07 Sep 2024 13:08), Max: 37.1 (06 Sep 2024 21:07)  T(F): 98.8 (07 Sep 2024 13:08), Max: 98.8 (07 Sep 2024 00:43)  HR: 54 (07 Sep 2024 13:55) (54 - 67)  BP: 121/77 (07 Sep 2024 13:55) (105/64 - 131/82)  BP(mean): --  RR: 18 (07 Sep 2024 13:55) (18 - 18)  SpO2: 98% (07 Sep 2024 13:55) (97% - 100%)    Parameters below as of 07 Sep 2024 13:55  Patient On (Oxygen Delivery Method): room air        PHYSICAL EXAM  CONSTITUTIONAL: Well groomed, no apparent distress  EYES: PERRLA and symmetric, EOMI, No conjunctival or scleral injection, non-icteric  ENMT: Oral mucosa with moist membranes.   RESP: No respiratory distress, no use of accessory muscles; CTA b/l, no WRR  CV: RRR, +S1S2, no MRG; no JVD; no peripheral edema  GI: Soft, mildly ttp in bilateral lower quadrants, mildly distended, no rebound or guarding  SKIN: No rashes or ulcers noted; no subcutaneous nodules or induration palpable  NEURO: CN II-XII intact; normal reflexes in upper and lower extremities, sensation intact in upper and lower extremities b/l to light touch   PSYCH: Appropriate insight/judgment; A+O x 3, mood and affect appropriate, recent/remote memory intact    LABS:                        13.4   7.21  )-----------( 228      ( 07 Sep 2024 10:50 )             39.7     09-07    135  |  104  |  5<L>  ----------------------------<  121<H>  3.8   |  21<L>  |  0.63    Ca    9.5      07 Sep 2024 10:50    TPro  7.3  /  Alb  4.5  /  TBili  1.2  /  DBili  x   /  AST  15  /  ALT  11  /  AlkPhos  45  09-07    PT/INR - ( 06 Sep 2024 21:21 )   PT: 11.1 sec;   INR: 0.97          PTT - ( 06 Sep 2024 21:21 )  PTT:33.5 sec  Urinalysis Basic - ( 07 Sep 2024 10:50 )    Color: Yellow / Appearance: Clear / S.005 / pH: x  Gluc: 121 mg/dL / Ketone: Trace mg/dL  / Bili: Negative / Urobili: 0.2 mg/dL   Blood: x / Protein: Negative mg/dL / Nitrite: Negative   Leuk Esterase: Negative / RBC: x / WBC x   Sq Epi: x / Non Sq Epi: x / Bacteria: x        RADIOLOGY & ADDITIONAL STUDIES:  CT Abdomen and Pelvis w/ Oral Cont and w/ IV Cont:   ACC: [id number] EXAM:  CT ABDOMEN AND PELVIS OC IC   ORDERED BY: MICHELLE VOGEL     PROCEDURE DATE:  2024          INTERPRETATION:  CLINICAL INFORMATION: R sided severe abd pain    COMPARISON: 2024 ultrasound    CONTRAST/COMPLICATIONS:  IV Contrast: Isovue 370  90 cc administered   10 cc discarded  Oral Contrast: NONE  Complications: None reported at time of study completion    PROCEDURE:  CT of the Abdomen and Pelvis was performed.  Sagittal and coronal reformats were performed.    FINDINGS:  LOWER CHEST: Within normal limits.    LIVER: Subcentimeter hyperenhancing lesion noted in segment 6 of the   liver, could be a flash filling hemangioma.  BILE DUCTS: Normal caliber.  GALLBLADDER: Within normal limits.  SPLEEN: Normal in size. Subcentimeter splenule noted.  PANCREAS: Within normal limits.  ADRENALS: Within normal limits.  KIDNEYS/URETERS: No renal stones or hydronephrosis.    BLADDER: Within normal limits.  REPRODUCTIVE ORGANS: Retroverted containing IUD. 3.3 cm left adnexal   cystic again demonstrated.    BOWEL: No bowel obstruction. Appendix is normal.  PERITONEUM/RETROPERITONEUM: Within normal limits.  VESSELS: Within normal limits.  LYMPH NODES: No lymphadenopathy.  ABDOMINAL WALL: Small fat-containing    Bowel: Ventral hernia.  BONES: Within normal limits.    IMPRESSION:  3.3 cm left adnexal cystic again demonstrated.        --- End of Report ---        ACC: [id number] EXAM:  US PELVIC COMPLETE   ORDERED BY: MARRY DING     ACC: [id number] EXAM:  US TRANSVAGINAL   ORDERED BY: MARRY DING     PROCEDURE DATE:  2024          INTERPRETATION:  CLINICAL INFORMATION: Abdominal pain, ovarian torsion    LMP: IUD    COMPARISON: None available.    TECHNIQUE:  Endovaginal and transabdominal pelvic sonogram. Color and Spectral   Doppler was performed.    FINDINGS:  Uterus: 6.6 cm x 3.8 cm x 4.9 cm. Satisfactory position of intrauterine   device with stem aligned within the endometrial cavity and cross bars   extending laterally at fundus.  Endometrium: 2 mm. Within normal limits. Cervical nabothian cysts.    Right ovary: 2.3 cm x 2.2 cm x 1.7 cm calculated volume of 4.3 cc. Within   normal limits. Normal arterial and venous waveforms.  Left ovary: 4.6 cm x 2.5 cm x 2.6 cm calculated volume of 15.5 cc.   ovarian cyst measuring 3.4 x 2.5 x 2.3 cm. Normal arterial and venous   waveforms.    Fluid: Trace.    IMPRESSION:  1.  No sonographic evidence of active torsion.  2.  3.4 cm left ovarian cyst.        --- End of Report ---          ELBA JEFFERS MD; Resident Radiologist  This document has been electronically signed.  SEBASTIAN MANNING MD; Attending Radiologist  This document has been electronically signed. Sep  6 2024 11:40PM      CURTIS GUSTAFSON MD; Attending Radiologist  This document has been electronically signed. Sep  85559 12:59PM (24 @ 12:47)

## 2024-09-08 VITALS
SYSTOLIC BLOOD PRESSURE: 106 MMHG | DIASTOLIC BLOOD PRESSURE: 70 MMHG | TEMPERATURE: 98 F | HEART RATE: 62 BPM | OXYGEN SATURATION: 98 % | RESPIRATION RATE: 14 BRPM

## 2024-09-08 PROBLEM — Z78.9 OTHER SPECIFIED HEALTH STATUS: Chronic | Status: INACTIVE | Noted: 2024-09-07 | Resolved: 2024-09-07

## 2024-09-08 LAB
ANION GAP SERPL CALC-SCNC: 8 MMOL/L — SIGNIFICANT CHANGE UP (ref 5–17)
BUN SERPL-MCNC: 2 MG/DL — LOW (ref 7–23)
C DIFF GDH STL QL: NEGATIVE — SIGNIFICANT CHANGE UP
C DIFF GDH STL QL: SIGNIFICANT CHANGE UP
CALCIUM SERPL-MCNC: 8.5 MG/DL — SIGNIFICANT CHANGE UP (ref 8.4–10.5)
CHLORIDE SERPL-SCNC: 108 MMOL/L — SIGNIFICANT CHANGE UP (ref 96–108)
CO2 SERPL-SCNC: 24 MMOL/L — SIGNIFICANT CHANGE UP (ref 22–31)
CREAT SERPL-MCNC: 0.63 MG/DL — SIGNIFICANT CHANGE UP (ref 0.5–1.3)
EGFR: 117 ML/MIN/1.73M2 — SIGNIFICANT CHANGE UP
GLUCOSE SERPL-MCNC: 88 MG/DL — SIGNIFICANT CHANGE UP (ref 70–99)
HCT VFR BLD CALC: 36.5 % — SIGNIFICANT CHANGE UP (ref 34.5–45)
HGB BLD-MCNC: 11.9 G/DL — SIGNIFICANT CHANGE UP (ref 11.5–15.5)
MAGNESIUM SERPL-MCNC: 1.9 MG/DL — SIGNIFICANT CHANGE UP (ref 1.6–2.6)
MCHC RBC-ENTMCNC: 30.7 PG — SIGNIFICANT CHANGE UP (ref 27–34)
MCHC RBC-ENTMCNC: 32.6 GM/DL — SIGNIFICANT CHANGE UP (ref 32–36)
MCV RBC AUTO: 94.3 FL — SIGNIFICANT CHANGE UP (ref 80–100)
NRBC # BLD: 0 /100 WBCS — SIGNIFICANT CHANGE UP (ref 0–0)
PHOSPHATE SERPL-MCNC: 2.1 MG/DL — LOW (ref 2.5–4.5)
PLATELET # BLD AUTO: 199 K/UL — SIGNIFICANT CHANGE UP (ref 150–400)
POTASSIUM SERPL-MCNC: 3.6 MMOL/L — SIGNIFICANT CHANGE UP (ref 3.5–5.3)
POTASSIUM SERPL-SCNC: 3.6 MMOL/L — SIGNIFICANT CHANGE UP (ref 3.5–5.3)
RBC # BLD: 3.87 M/UL — SIGNIFICANT CHANGE UP (ref 3.8–5.2)
RBC # FLD: 12 % — SIGNIFICANT CHANGE UP (ref 10.3–14.5)
SODIUM SERPL-SCNC: 140 MMOL/L — SIGNIFICANT CHANGE UP (ref 135–145)
WBC # BLD: 4.32 K/UL — SIGNIFICANT CHANGE UP (ref 3.8–10.5)
WBC # FLD AUTO: 4.32 K/UL — SIGNIFICANT CHANGE UP (ref 3.8–10.5)

## 2024-09-08 PROCEDURE — 87324 CLOSTRIDIUM AG IA: CPT

## 2024-09-08 PROCEDURE — 96375 TX/PRO/DX INJ NEW DRUG ADDON: CPT

## 2024-09-08 PROCEDURE — 96374 THER/PROPH/DIAG INJ IV PUSH: CPT

## 2024-09-08 PROCEDURE — 84100 ASSAY OF PHOSPHORUS: CPT

## 2024-09-08 PROCEDURE — 74177 CT ABD & PELVIS W/CONTRAST: CPT | Mod: MC

## 2024-09-08 PROCEDURE — 80053 COMPREHEN METABOLIC PANEL: CPT

## 2024-09-08 PROCEDURE — 81025 URINE PREGNANCY TEST: CPT

## 2024-09-08 PROCEDURE — 85025 COMPLETE CBC W/AUTO DIFF WBC: CPT

## 2024-09-08 PROCEDURE — 99239 HOSP IP/OBS DSCHRG MGMT >30: CPT | Mod: GC

## 2024-09-08 PROCEDURE — 83735 ASSAY OF MAGNESIUM: CPT

## 2024-09-08 PROCEDURE — 96376 TX/PRO/DX INJ SAME DRUG ADON: CPT

## 2024-09-08 PROCEDURE — 81003 URINALYSIS AUTO W/O SCOPE: CPT

## 2024-09-08 PROCEDURE — 99285 EMERGENCY DEPT VISIT HI MDM: CPT

## 2024-09-08 PROCEDURE — 87507 IADNA-DNA/RNA PROBE TQ 12-25: CPT

## 2024-09-08 PROCEDURE — 36415 COLL VENOUS BLD VENIPUNCTURE: CPT

## 2024-09-08 PROCEDURE — 87449 NOS EACH ORGANISM AG IA: CPT

## 2024-09-08 PROCEDURE — 87177 OVA AND PARASITES SMEARS: CPT

## 2024-09-08 PROCEDURE — 80048 BASIC METABOLIC PNL TOTAL CA: CPT

## 2024-09-08 PROCEDURE — 83690 ASSAY OF LIPASE: CPT

## 2024-09-08 PROCEDURE — 85027 COMPLETE CBC AUTOMATED: CPT

## 2024-09-08 RX ORDER — KETOROLAC TROMETHAMINE 30 MG/ML
15 INJECTION, SOLUTION INTRAMUSCULAR EVERY 6 HOURS
Refills: 0 | Status: DISCONTINUED | OUTPATIENT
Start: 2024-09-08 | End: 2024-09-08

## 2024-09-08 RX ORDER — ESCITALOPRAM OXALATE 10 MG/1
5 TABLET ORAL DAILY
Refills: 0 | Status: DISCONTINUED | OUTPATIENT
Start: 2024-09-08 | End: 2024-09-08

## 2024-09-08 RX ADMIN — KETOROLAC TROMETHAMINE 15 MILLIGRAM(S): 30 INJECTION, SOLUTION INTRAMUSCULAR at 07:31

## 2024-09-08 RX ADMIN — KETOROLAC TROMETHAMINE 15 MILLIGRAM(S): 30 INJECTION, SOLUTION INTRAMUSCULAR at 12:50

## 2024-09-08 RX ADMIN — ACETAMINOPHEN 650 MILLIGRAM(S): 325 TABLET ORAL at 10:48

## 2024-09-08 RX ADMIN — ACETAMINOPHEN 650 MILLIGRAM(S): 325 TABLET ORAL at 18:39

## 2024-09-08 RX ADMIN — KETOROLAC TROMETHAMINE 15 MILLIGRAM(S): 30 INJECTION, SOLUTION INTRAMUSCULAR at 00:17

## 2024-09-08 RX ADMIN — ESCITALOPRAM OXALATE 5 MILLIGRAM(S): 10 TABLET ORAL at 12:34

## 2024-09-08 RX ADMIN — KETOROLAC TROMETHAMINE 15 MILLIGRAM(S): 30 INJECTION, SOLUTION INTRAMUSCULAR at 01:17

## 2024-09-08 RX ADMIN — KETOROLAC TROMETHAMINE 15 MILLIGRAM(S): 30 INJECTION, SOLUTION INTRAMUSCULAR at 12:35

## 2024-09-08 RX ADMIN — ACETAMINOPHEN 650 MILLIGRAM(S): 325 TABLET ORAL at 11:48

## 2024-09-08 RX ADMIN — ACETAMINOPHEN 650 MILLIGRAM(S): 325 TABLET ORAL at 18:02

## 2024-09-08 RX ADMIN — KETOROLAC TROMETHAMINE 15 MILLIGRAM(S): 30 INJECTION, SOLUTION INTRAMUSCULAR at 06:39

## 2024-09-08 NOTE — DISCHARGE NOTE PROVIDER - NSDCCPCAREPLAN_GEN_ALL_CORE_FT
PRINCIPAL DISCHARGE DIAGNOSIS  Diagnosis: Gastroenteritis  Assessment and Plan of Treatment: You came in with nausea, vomiting and diarrhea and were found to have gastroenteritis. We had imaging done of your abdomen which was normal. All lab work was also normal including clostridium difficile bacteria. Ultrasound of your pelvis showed a 3.4 cm left ovarian cyst. You were treated with fluids and pain management while in the hospital. Gastroenteritis is an intestinal infection that includes signs and symptoms such as watery diarrhea, stomach cramps, nausea or vomiting, and sometimes fever.The most common way to develop viral gastroenteritis — often called stomach flu — is through contact with an infected person or by consuming contaminated food or water. Patient was medically optimized, stable and ready for discharge. Plan of care and return precautions were discussed with the patient who verbally stated understanding.  -------------------------------------------------------------------  Please follow up with your primary care provider   Please follow up with gynecology outpatient for your ovarian cyst  You can take tylenol for abdominal pain  You can also take oxycodone 2.5mg as needed for severe pain     PRINCIPAL DISCHARGE DIAGNOSIS  Diagnosis: Gastroenteritis  Assessment and Plan of Treatment: You came in with nausea, vomiting and diarrhea and were found to have gastroenteritis. We had imaging done of your abdomen which was normal. All lab work was also normal including clostridium difficile bacteria. Ultrasound of your pelvis showed a 3.4 cm left ovarian cyst. You were treated with fluids and pain management while in the hospital. Gastroenteritis is an intestinal infection that includes signs and symptoms such as watery diarrhea, stomach cramps, nausea or vomiting, and sometimes fever.The most common way to develop viral gastroenteritis — often called stomach flu — is through contact with an infected person or by consuming contaminated food or water. Patient was medically optimized, stable and ready for discharge. Plan of care and return precautions were discussed with the patient who verbally stated understanding.  -------------------------------------------------------------------  Please follow up with your primary care provider   Please follow up with gynecology outpatient for your ovarian cyst  Take tylenol up to 4g daily for abdominal pain  Take oxycodone 2.5mg as needed for severe pain     PRINCIPAL DISCHARGE DIAGNOSIS  Diagnosis: Gastroenteritis  Assessment and Plan of Treatment: You came in with nausea, vomiting and diarrhea and were found to have gastroenteritis. We had imaging done of your abdomen which was normal. All lab work was also normal including clostridium difficile bacteria. Ultrasound of your pelvis showed a 3.4 cm left ovarian cyst. You were treated with fluids and pain management while in the hospital. Gastroenteritis is an intestinal infection that includes signs and symptoms such as watery diarrhea, stomach cramps, nausea or vomiting, and sometimes fever.The most common way to develop viral gastroenteritis — often called stomach flu — is through contact with an infected person or by consuming contaminated food or water. Patient was medically optimized, stable and ready for discharge. Plan of care and return precautions were discussed with the patient who verbally stated understanding.  -------------------------------------------------------------------  Please follow up with your primary care provider   Please follow up with gynecology outpatient for your ovarian cyst  Take tylenol up to 4g daily for abdominal pain  Take oxycodone 2.5mg as needed for severe pain      SECONDARY DISCHARGE DIAGNOSES  Diagnosis: Nausea, vomiting, and diarrhea  Assessment and Plan of Treatment:     Diagnosis: Abdominal pain  Assessment and Plan of Treatment:

## 2024-09-08 NOTE — DISCHARGE NOTE PROVIDER - NSDCFUSCHEDAPPT_GEN_ALL_CORE_FT
[facility]  GASTRO [address] [facility] [address] [facility]  Scheduled Appointment: 09/13/2024

## 2024-09-08 NOTE — DISCHARGE NOTE PROVIDER - NS AS DC PROVIDER CONTACT Y/N MULTI
CHIEF COMPLAINT:    Chief Complaint   Patient presents with   • Diarrhea       HPI:  Delfina Olivo is an 54 year old female who presents with non bloody loose stools x2 this morning with abdominal cramping.  She has fatigue and feels feverish but no objective fever. No cough, congestion, sore throat, nausea, vomiting, shortness of breath, chest pain, loss of taste/smell. Her  has similar symptoms that started a few days ago and now has fever and congestion. She is COVID vaccinated, denies any sick contacts except for her .     REVIEW OF SYSTEMS:  Comprehensive review of systems was reviewed and discussed with the patient, and was otherwise negative, except as noted in the history of present illness, above.    PAST MEDICAL, SURGICAL, MEDICATION, ALLERGY, & SOCIAL HISTORIES:  I have reviewed the past medical history, family history, social history, medications and allergies listed in the medical record as obtained by my nursing staff and support staff and agree with their documentation.    Past Medical History:   Diagnosis Date   • Arrhythmia    • Arthritis    • Chronic back pain    • Esophageal reflux    • Essential hypertension, benign    • Fibromyalgia    • GERD (gastroesophageal reflux disease)    • Other and unspecified hyperlipidemia    • Other chronic pain    • Ovarian cyst 04/01/2011    3 cm   • Pneumonia, organism unspecified(486)    • Sleep apnea    • Thyroid disease    • Umbilical hernia    • Urinary incontinence      Past Surgical History:   Procedure Laterality Date   • Cholecystectomy     • Esophagogastroduodenoscopy  06/08/2012   • Gynecologic cryosurgery      cyst removed   • Hysterectomy      cervix was left.   • Removal gallbladder     • Service to gastroenterology         OBJECTIVE:  Visit Vitals  /82   Pulse 76   Temp 98 °F (36.7 °C) (Temporal)   Wt 115.2 kg (254 lb)   SpO2 97%   BMI 46.46 kg/m²       Physical Exam:  General:  Pleasant, well-developed, well-nourished,  adult female in no acute distress, breathing comfortably, and well-hydrated.  HEENT: Eyes: conjunctivae/sclerae normal. No erythema, edema or exudate.  Nose: nares patent  Mouth: Lips, oral mucosa, and tongue normal. Teeth and gums normal. Oropharynx clear  Ears: External ears normal. Canals clear. Tympanic membranes are clear with all landmarks noted.   Sinuses:nontender  NECK:  supple and no submandibular, cervical or supraclavicular adenopathy or thyromegaly  CV:  Regular rate, regular rhythm, No murmur, rub, gallop  RESP:  clear to auscultation and inspiratory effort good  ABDOMEN:  soft, non-distended, non-tender to palpation, no rigidity, no guarding EXCEPT for generalized tenderness in the lower abdomen.   BACK:   No CVA tenderness.  Spine shows normal curvatures.  No bony or midline tenderness.  Range of motion full and without discomfort.  SKIN:  dry and warm.  EXTREMITIES:  Normal appearing;  No cyanosis, clubbing and No lower extremity edema    ASSESSMENT:  ED Diagnosis   1. Diarrhea, unspecified type  COVID DIAGNOSTIC TEST       PLAN:  Orders Placed This Encounter   • COVID DIAGNOSTIC TESTING     Likely viral diarrhea. COVID-19 testing performed, will call with results. If positive, follow CDC guidelines. We discussed monoclonal antibody infusion as she has DM, HTN, obesity and hx of pneumonia. She will consider this and follow-up with PCP if positive.     Follow-up recommendation is made to see Primary Care Provider for recheck and to coordinate further care as necessary.  Patient is welcome to return for worsening symptoms, intolerance of treatment, or any other concerns.    Dr Primitivo Espitia is my supervising physician.       Ailin Ramey PA-C      Yes

## 2024-09-08 NOTE — DISCHARGE NOTE PROVIDER - ATTENDING DISCHARGE PHYSICAL EXAMINATION:
Patient was seen and examined at bedside on 9/8/2024 at 10 am and then 2 pm. Reports improvement of all presenting symptoms. Reports diarrhea persists but has decreased. Nausea and vomiting have mostly resolved - is able to tolerate PO. Abdominal pain is intermittent and has decreased from a 10/10 to a ~ 6/10. Denies SOB, CP. ROS is otherwise negative. Vitals, labwork and pertinent imaging reviewed. Exam - NAD, AAO x 4, PERRLA, EOMI, MMM, supple neck, chest - CTA b/l, CV - rrr, s1s2, no m/r/g, abd - soft, NTND, + BS, ext - wwp, psych - normal affect    Plan:  -C/w supportive care, close outpatient follow up  -Return precautions discussed    Of note spoke to patient on 9/10 at ~ 630 pm - reports that she feels thought "she has turned a corner"; feels that her appetite has improved, diarrhea has decreased, pain is down to 1 -2. Will see GI Dr. Phan on 9/13

## 2024-09-08 NOTE — PROGRESS NOTE ADULT - PROBLEM SELECTOR PLAN 3
TVUS revealing left 3.4 cm ovarian cyst with no evidence of torsion.   - gyn consulted  - f/u with outpatient OBGYN

## 2024-09-08 NOTE — PROGRESS NOTE ADULT - SUBJECTIVE AND OBJECTIVE BOX
INTERVAL/OVERNIGHT EVENTS: None    SUBJECTIVE:  Patient seen and examined at bedside, comfortable, NAD. She is feeling better but still with lower abdominal cramping. Denies nausea, vomiting. She did have 3 loose stools overnight, no blood in stool. Denied fever, chest pain, dyspnea.    Vital Signs Last 12 Hrs  T(F): 98.6 (24 @ 05:03), Max: 98.6 (24 @ 05:03)  HR: 64 (24 @ 05:03) (64 - 64)  BP: 107/72 (24 @ 05:03) (107/72 - 107/72)  BP(mean): --  RR: 16 (24 @ 05:03) (16 - 16)  SpO2: 97% (24 @ 05:03) (97% - 97%)  I&O's Summary      PHYSICAL EXAM:  General: NAD  HEENT: PERRL, EOM intact, sclera anicteric, MMM  Cardiovascular: RRR; no MRG;   Respiratory: CTAB; no WRR  GI/: soft; ND; BS x4, +mild tenderness in RLQ and LLQ  Extremities: WWP; 2+ peripheral pulses bilaterally; no LE edema  Skin: normal color & turgor; no rash  Neurologic: aox3; no focal deficits    LABS:                        13.4   7.21  )-----------( 228      ( 07 Sep 2024 10:50 )             39.7         135  |  104  |  5<L>  ----------------------------<  121<H>  3.8   |  21<L>  |  0.63    Ca    9.5      07 Sep 2024 10:50    TPro  7.3  /  Alb  4.5  /  TBili  1.2  /  DBili  x   /  AST  15  /  ALT  11  /  AlkPhos  45      PT/INR - ( 06 Sep 2024 21:21 )   PT: 11.1 sec;   INR: 0.97          PTT - ( 06 Sep 2024 21:21 )  PTT:33.5 sec  Urinalysis Basic - ( 07 Sep 2024 10:50 )    Color: Yellow / Appearance: Clear / S.005 / pH: x  Gluc: 121 mg/dL / Ketone: Trace mg/dL  / Bili: Negative / Urobili: 0.2 mg/dL   Blood: x / Protein: Negative mg/dL / Nitrite: Negative   Leuk Esterase: Negative / RBC: x / WBC x   Sq Epi: x / Non Sq Epi: x / Bacteria: x          RADIOLOGY & ADDITIONAL TESTS:    MEDICATIONS  (STANDING):  dextrose 5% + lactated ringers. 1000 milliLiter(s) (80 mL/Hr) IV Continuous <Continuous>  escitalopram 5 milliGRAM(s) Oral daily    MEDICATIONS  (PRN):  acetaminophen     Tablet .. 650 milliGRAM(s) Oral every 6 hours PRN Temp greater or equal to 38C (100.4F), Mild Pain (1 - 3), Moderate Pain (4 - 6)  ondansetron Injectable 4 milliGRAM(s) IV Push every 6 hours PRN Nausea and/or Vomiting

## 2024-09-08 NOTE — PROGRESS NOTE ADULT - PROBLEM SELECTOR PLAN 1
Patient presenting with 3 day history of abdominal pain associated with nausea, vomiting, diarrhea. Currently afebrile, with no leukocytosis and CBC and CMP wnl. HCG negative. Gynecology was consulted during 9/6 ED visit with TVUS revealing left 3.4 cm ovarian cyst with no evidence of torsion. CTAP negative for any acute pathology. Surgery was consulted for further evaluation however no acute surgical intervention needed at this time. GI PCR negative. On re-examination, patient noted decrease in her abdominal pain and n/v/d, and was able to tolerate PO intake. Likely 2/2 gastroenteritis  c.diff neg    Plan:  - supportive care with IV hydration, zofran, tylenol, toradol  - advance diet as tolerated  - consider GI consult if pt doesn't improve

## 2024-09-08 NOTE — DISCHARGE NOTE NURSING/CASE MANAGEMENT/SOCIAL WORK - PATIENT PORTAL LINK FT
You can access the FollowMyHealth Patient Portal offered by [facility] by registering at the following website: [email or web address]. By joining [facility]’s FollowMyHealth portal, you will also be able to view your health information using other applications (apps) compatible with our system.

## 2024-09-08 NOTE — DISCHARGE NOTE NURSING/CASE MANAGEMENT/SOCIAL WORK - NSDCPEFALRISK_GEN_ALL_CORE
For information on Fall & Injury Prevention, visit: [email or web address] OR  [email or web address] OR  https://www.cdc.gov/steadi/patient.html

## 2024-09-08 NOTE — DISCHARGE NOTE PROVIDER - HOSPITAL COURSE
39 year old female with PMHx anxiety presenting with 3 day history of abdominal pain, nausea, vomiting, diarrhea of unclear etiology, admitted for further management.    1:Abdominal pain.    Patient presenting with 3 day history of abdominal pain associated with nausea, vomiting, diarrhea. Currently afebrile, with no leukocytosis and CBC and CMP wnl. HCG negative. Gynecology was consulted during 9/6 ED visit with TVUS revealing left 3.4 cm ovarian cyst with no evidence of torsion. CTAP negative for any acute pathology. Surgery was consulted for further evaluation however no acute surgical intervention needed at this time. GI PCR negative. On re-examination, patient noted decrease in her abdominal pain and n/v/d, and was able to tolerate PO intake. Likely 2/2 gastroenteritis  c.diff neg  inpatient tx with fluids and pain management  Plan:  - Follow up outpatient with PCP  - Can take tylenol 1g total per day  - Can take oxycodone 2.5mg if with severe pain    2:Nausea, vomiting, and diarrhea.   3 day history of abdominal pain, n/v/d as noted above.  S/p 2L NS in ED  - f/u outpatient with PCP    3: Ovarian cyst.   TVUS revealing left 3.4 cm ovarian cyst with no evidence of torsion.   - f/u with outpatient OBGYN.    4:Anxiety.   Home med: lexapro 5 mg daily  - continue home lexapro.    New medications: oxycodone 2.5mg prn  Changes to old medications: None  Items to follow up outpatient: PCP, OBGYN  Physical exam at time of discharge:  General: NAD  HEENT: PERRL, EOM intact, sclera anicteric, MMM  Cardiovascular: RRR; no MRG;   Respiratory: CTAB; no WRR  GI/: soft; ND; BS x4, +mild tenderness in RLQ and LLQ  Extremities: WWP; 2+ peripheral pulses bilaterally; no LE edema  Skin: normal color & turgor; no rash  Neurologic: aox3; no focal deficits

## 2024-09-11 PROBLEM — F41.9 ANXIETY DISORDER, UNSPECIFIED: Chronic | Status: ACTIVE | Noted: 2024-09-07

## 2024-09-12 LAB
CULTURE RESULTS: SIGNIFICANT CHANGE UP
SPECIMEN SOURCE: SIGNIFICANT CHANGE UP

## 2024-09-13 ENCOUNTER — APPOINTMENT (OUTPATIENT)
Dept: GASTROENTEROLOGY | Facility: CLINIC | Age: 39
End: 2024-09-13
Payer: COMMERCIAL

## 2024-09-13 ENCOUNTER — RX RENEWAL (OUTPATIENT)
Age: 39
End: 2024-09-13

## 2024-09-13 VITALS
SYSTOLIC BLOOD PRESSURE: 114 MMHG | WEIGHT: 118 LBS | RESPIRATION RATE: 14 BRPM | OXYGEN SATURATION: 98 % | DIASTOLIC BLOOD PRESSURE: 62 MMHG | HEART RATE: 72 BPM | BODY MASS INDEX: 22.28 KG/M2 | HEIGHT: 61 IN | TEMPERATURE: 95.8 F

## 2024-09-13 DIAGNOSIS — R11.2 NAUSEA WITH VOMITING, UNSPECIFIED: ICD-10-CM

## 2024-09-13 DIAGNOSIS — R10.13 EPIGASTRIC PAIN: ICD-10-CM

## 2024-09-13 DIAGNOSIS — R10.9 UNSPECIFIED ABDOMINAL PAIN: ICD-10-CM

## 2024-09-13 DIAGNOSIS — R19.7 DIARRHEA, UNSPECIFIED: ICD-10-CM

## 2024-09-13 PROCEDURE — 99204 OFFICE O/P NEW MOD 45 MIN: CPT

## 2024-09-13 RX ORDER — OMEPRAZOLE 40 MG/1
40 CAPSULE, DELAYED RELEASE ORAL
Qty: 90 | Refills: 0 | Status: ACTIVE | COMMUNITY
Start: 2024-09-13 | End: 1900-01-01

## 2024-09-13 NOTE — PHYSICAL EXAM
[Alert] : alert [Sclera] : the sclera and conjunctiva were normal [Hearing Threshold Finger Rub Not [place]] : hearing was normal [No Respiratory Distress] : no respiratory distress [No Acc Muscle Use] : no accessory muscle use [Abdomen Tenderness] : non-tender [No Masses] : no abdominal mass palpated [Abdomen Soft] : soft [Oriented To Time, Place, And Person] : oriented to person, place, and time

## 2024-09-13 NOTE — ASSESSMENT
[FreeTextEntry1] : 39f w/ no sig PMHX here as NP for acute diarrhea, nausea/vomiting, abd pain.   # Presumed bout of severe infectious gastroenteritis # Residual dyspepsia # Residual loose stools Presentation c/w w/ current post-infectious syndrome. No alarm signs.  - Counseled on natural progression of infectious gastroenteritis, possible time frame of improvement (days-weeks) - Counseled on dietary vigilance, avoidance of dairy and spicy/greasy foods, consideration for bland diet - Counseled on fiber supp - Ok to use imodium OTC use PRN for diarrhea - Check UBT given dyspepsia - Empiric Omeprazole 40 qday x 4 weeks - Counseled on alarm signs  FU PRN

## 2024-09-14 DIAGNOSIS — F12.99 CANNABIS USE, UNSPECIFIED WITH UNSPECIFIED CANNABIS-INDUCED DISORDER: ICD-10-CM

## 2024-09-14 DIAGNOSIS — R10.31 RIGHT LOWER QUADRANT PAIN: ICD-10-CM

## 2024-09-14 DIAGNOSIS — N83.202 UNSPECIFIED OVARIAN CYST, LEFT SIDE: ICD-10-CM

## 2024-09-14 DIAGNOSIS — K52.9 NONINFECTIVE GASTROENTERITIS AND COLITIS, UNSPECIFIED: ICD-10-CM

## 2024-09-14 DIAGNOSIS — F41.9 ANXIETY DISORDER, UNSPECIFIED: ICD-10-CM

## 2024-09-16 LAB — UREA BREATH TEST QL: NEGATIVE

## 2024-09-24 DIAGNOSIS — R10.9 UNSPECIFIED ABDOMINAL PAIN: ICD-10-CM

## 2024-09-24 DIAGNOSIS — R19.7 DIARRHEA, UNSPECIFIED: ICD-10-CM

## 2024-09-24 RX ORDER — DICYCLOMINE HYDROCHLORIDE 10 MG/1
10 CAPSULE ORAL 4 TIMES DAILY
Qty: 30 | Refills: 2 | Status: ACTIVE | COMMUNITY
Start: 2024-09-24 | End: 1900-01-01

## 2024-12-18 ENCOUNTER — NON-APPOINTMENT (OUTPATIENT)
Age: 39
End: 2024-12-18

## 2024-12-18 ENCOUNTER — APPOINTMENT (OUTPATIENT)
Dept: OPHTHALMOLOGY | Facility: CLINIC | Age: 39
End: 2024-12-18
Payer: COMMERCIAL

## 2024-12-18 PROCEDURE — 68761 CLOSE TEAR DUCT OPENING: CPT | Mod: E1,E2,E3,E4

## 2024-12-18 PROCEDURE — 92012 INTRM OPH EXAM EST PATIENT: CPT | Mod: 25

## 2024-12-18 PROCEDURE — 92025 CPTRIZED CORNEAL TOPOGRAPHY: CPT

## 2025-04-02 ENCOUNTER — NON-APPOINTMENT (OUTPATIENT)
Age: 40
End: 2025-04-02

## 2025-04-02 ENCOUNTER — APPOINTMENT (OUTPATIENT)
Dept: OPHTHALMOLOGY | Facility: CLINIC | Age: 40
End: 2025-04-02
Payer: COMMERCIAL

## 2025-04-02 PROCEDURE — 68761 CLOSE TEAR DUCT OPENING: CPT | Mod: E1,E2,E3,E4

## 2025-04-02 PROCEDURE — 92012 INTRM OPH EXAM EST PATIENT: CPT | Mod: 25

## 2025-06-23 ENCOUNTER — APPOINTMENT (OUTPATIENT)
Dept: OPHTHALMOLOGY | Facility: CLINIC | Age: 40
End: 2025-06-23
Payer: COMMERCIAL

## 2025-06-23 ENCOUNTER — NON-APPOINTMENT (OUTPATIENT)
Age: 40
End: 2025-06-23

## 2025-06-23 PROCEDURE — 92012 INTRM OPH EXAM EST PATIENT: CPT | Mod: 25

## 2025-06-23 PROCEDURE — 68761 CLOSE TEAR DUCT OPENING: CPT | Mod: E1,E2,E3,E4

## 2025-09-08 ENCOUNTER — APPOINTMENT (OUTPATIENT)
Dept: OPHTHALMOLOGY | Facility: CLINIC | Age: 40
End: 2025-09-08
Payer: COMMERCIAL

## 2025-09-08 ENCOUNTER — NON-APPOINTMENT (OUTPATIENT)
Age: 40
End: 2025-09-08

## 2025-09-08 PROCEDURE — 92012 INTRM OPH EXAM EST PATIENT: CPT | Mod: 25

## 2025-09-08 PROCEDURE — 68761 CLOSE TEAR DUCT OPENING: CPT | Mod: E1,E2,E3,E4
